# Patient Record
Sex: FEMALE | URBAN - METROPOLITAN AREA
[De-identification: names, ages, dates, MRNs, and addresses within clinical notes are randomized per-mention and may not be internally consistent; named-entity substitution may affect disease eponyms.]

---

## 2022-08-10 ENCOUNTER — OFFICE VISIT (OUTPATIENT)
Dept: URBAN - METROPOLITAN AREA CLINIC 23 | Facility: CLINIC | Age: 87
End: 2022-08-10
Payer: MEDICARE

## 2022-08-10 DIAGNOSIS — E11.3393 TYPE 2 DIAB W MODERATE NONPRLF DIAB RTNOP W/O MACULAR EDEMA, BILATERAL: ICD-10-CM

## 2022-08-10 DIAGNOSIS — H25.813 COMBINED FORMS OF AGE-RELATED CATARACT, BILATERAL: Primary | ICD-10-CM

## 2022-08-10 PROCEDURE — 99204 OFFICE O/P NEW MOD 45 MIN: CPT | Performed by: OPTOMETRIST

## 2022-08-10 ASSESSMENT — KERATOMETRY
OS: 41.13
OD: 41.13

## 2022-08-10 ASSESSMENT — VISUAL ACUITY: OD: 20/50

## 2022-08-10 NOTE — IMPRESSION/PLAN
Impression: Type 2 diab w moderate nonprlf diab rtnop w/o macular edema, bilateral: B32.5255. Plan: Discussed moderate BDR and past laser. Discussed significant flame hemes near macula. Recommend follow up with a retina specialist every six months, sooner if vision changes.

## 2022-08-10 NOTE — IMPRESSION/PLAN
Impression: Combined forms of age-related cataract, bilateral: H25.813. Plan: Discussed cataracts. Pt and son reluctant to proceed unless significant vision improvement is possible. History of many injections without visual improvement and MAP/PRP in the past. No improvement expected OS and only moderate improvement expected OD. Recommend monitoring. They are in agreement.